# Patient Record
Sex: FEMALE | URBAN - NONMETROPOLITAN AREA
[De-identification: names, ages, dates, MRNs, and addresses within clinical notes are randomized per-mention and may not be internally consistent; named-entity substitution may affect disease eponyms.]

---

## 2024-02-14 ENCOUNTER — APPOINTMENT (OUTPATIENT)
Dept: URBAN - NONMETROPOLITAN AREA SURGERY 8 | Age: 69
Setting detail: DERMATOLOGY
End: 2024-02-15

## 2024-02-14 DIAGNOSIS — L30.9 DERMATITIS, UNSPECIFIED: ICD-10-CM

## 2024-02-14 PROCEDURE — OTHER MIPS QUALITY: OTHER

## 2024-02-14 PROCEDURE — OTHER BIOPSY BY PUNCH METHOD: OTHER

## 2024-02-14 PROCEDURE — 11104 PUNCH BX SKIN SINGLE LESION: CPT

## 2024-02-14 PROCEDURE — 11105 PUNCH BX SKIN EA SEP/ADDL: CPT

## 2024-02-14 PROCEDURE — OTHER TREATMENT REGIMEN: OTHER

## 2024-02-14 ASSESSMENT — LOCATION SIMPLE DESCRIPTION DERM
LOCATION SIMPLE: LEFT UPPER ARM
LOCATION SIMPLE: RIGHT THIGH

## 2024-02-14 ASSESSMENT — LOCATION DETAILED DESCRIPTION DERM
LOCATION DETAILED: LEFT ANTERIOR DISTAL UPPER ARM
LOCATION DETAILED: RIGHT ANTERIOR PROXIMAL THIGH

## 2024-02-14 ASSESSMENT — LOCATION ZONE DERM
LOCATION ZONE: ARM
LOCATION ZONE: LEG

## 2024-02-14 NOTE — PROCEDURE: TREATMENT REGIMEN
Detail Level: Zone
Plan: New onset dermatitis in February 2023 with associated joint pain and inflammation.  Being followed by rheumatology.  She was recently treated triamcinolone and prednisone. During that time, she notes the rash improved.  We discussed that findings on biopsy may be impacted by treatment, but can be repeated if need be.

## 2024-02-14 NOTE — HPI: RASH
Is This A New Presentation, Or A Follow-Up?: Rash
Additional History: X-ray for hand swelling showed some arthritis \\nX-ray for shoulder swelling showed normal \\nX-ray for feet showed normal\\nX-ray for hip showed normal

## 2024-02-14 NOTE — PROCEDURE: BIOPSY BY PUNCH METHOD
Body Location Override (Optional - Billing Will Still Be Based On Selected Body Map Location If Applicable): right thigh
Detail Level: Detailed
Was A Bandage Applied: Yes
Punch Size In Mm: 4
Size Of Lesion In Cm (Optional): 0
Depth Of Punch Biopsy: dermis
Biopsy Type: H and E
Anesthesia Type: 1% lidocaine with epinephrine
Anesthesia Volume In Cc: 0.5
Hemostasis: None
Epidermal Sutures: gel foam
Wound Care: Petrolatum
Dressing: bandage
Patient Will Remove Sutures At Home?: No
Lab: [id number]
Consent: Written consent was obtained and risks were reviewed including but not limited to scarring, infection, bleeding, scabbing, incomplete removal, nerve damage and allergy to anesthesia.
Post-Care Instructions: I reviewed with the patient in detail post-care instructions. Patient is to keep the biopsy site dry overnight, and then apply bacitracin twice daily until healed. Patient may apply hydrogen peroxide soaks to remove any crusting.
Home Suture Removal Text: Patient was provided a home suture removal kit and will remove their sutures at home.  If they have any questions or difficulties they will call the office.
Notification Instructions: Patient will be notified of biopsy results. However, patient instructed to call the office if not contacted within 2 weeks.
Billing Type: Third-Party Bill
Information: Selecting Yes will display possible errors in your note based on the variables you have selected. This validation is only offered as a suggestion for you. PLEASE NOTE THAT THE VALIDATION TEXT WILL BE REMOVED WHEN YOU FINALIZE YOUR NOTE. IF YOU WANT TO FAX A PRELIMINARY NOTE YOU WILL NEED TO TOGGLE THIS TO 'NO' IF YOU DO NOT WANT IT IN YOUR FAXED NOTE.
Body Location Override (Optional - Billing Will Still Be Based On Selected Body Map Location If Applicable): left upper arm

## 2024-02-27 ENCOUNTER — APPOINTMENT (OUTPATIENT)
Dept: URBAN - NONMETROPOLITAN AREA SURGERY 8 | Age: 69
Setting detail: DERMATOLOGY
End: 2024-02-27

## 2024-02-27 DIAGNOSIS — L92.0 GRANULOMA ANNULARE: ICD-10-CM

## 2024-02-27 PROCEDURE — OTHER PRESCRIPTION: OTHER

## 2024-02-27 PROCEDURE — OTHER COUNSELING: OTHER

## 2024-02-27 PROCEDURE — 99213 OFFICE O/P EST LOW 20 MIN: CPT

## 2024-02-27 PROCEDURE — OTHER PRESCRIPTION MEDICATION MANAGEMENT: OTHER

## 2024-02-27 PROCEDURE — OTHER MIPS QUALITY: OTHER

## 2024-02-27 PROCEDURE — OTHER DIAGNOSIS COMMENT: OTHER

## 2024-02-27 RX ORDER — TACROLIMUS 1 MG/G
OINTMENT TOPICAL
Qty: 100 | Refills: 4 | Status: ERX | COMMUNITY
Start: 2024-02-27

## 2024-02-27 ASSESSMENT — LOCATION SIMPLE DESCRIPTION DERM
LOCATION SIMPLE: RIGHT THIGH
LOCATION SIMPLE: LEFT UPPER ARM

## 2024-02-27 ASSESSMENT — LOCATION DETAILED DESCRIPTION DERM
LOCATION DETAILED: LEFT ANTERIOR PROXIMAL UPPER ARM
LOCATION DETAILED: RIGHT ANTERIOR PROXIMAL THIGH

## 2024-02-27 ASSESSMENT — LOCATION ZONE DERM
LOCATION ZONE: LEG
LOCATION ZONE: ARM

## 2024-02-27 NOTE — PROCEDURE: DIAGNOSIS COMMENT
Render Risk Assessment In Note?: no
Detail Level: Simple
Comment: Accession#: [id number], biopsy proven GA.

## 2024-02-27 NOTE — PROCEDURE: PRESCRIPTION MEDICATION MANAGEMENT
Initiate Treatment: Tacrolimus to affected areas bid
Render In Strict Bullet Format?: No
Detail Level: Zone
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 She has used triamcinolone and brief taper of prednisone in the past. Start tacrolimus bid to the affected areas. She is being worked up for possible psoriatic arthritis. At times, methotrexate can help both PsA and GA. TNF inhibitors have been reported to help with GA. She has follow up with her rheumatologist to discuss treatment for her joint pain in the near future.  She will follow up 3 months with us.

## 2024-02-27 NOTE — PROCEDURE: MIPS QUALITY
Detail Level: Detailed
Quality 47: Advance Care Plan: Advance Care Planning discussed and documented in the medical record; patient did not wish or was not able to name a surrogate decision maker or provide an advance care plan.
Quality 431: Preventive Care And Screening: Unhealthy Alcohol Use - Screening: Patient not identified as an unhealthy alcohol user when screened for unhealthy alcohol use using a systematic screening method
Quality 130: Documentation Of Current Medications In The Medical Record: Current Medications Documented
Quality 110: Preventive Care And Screening: Influenza Immunization: Influenza Immunization previously received during influenza season
Quality 226: Preventive Care And Screening: Tobacco Use: Screening And Cessation Intervention: Patient screened for tobacco use and is an ex/non-smoker
Additional Notes: *** Meds documented to the best of my ability with the resources available

## 2024-06-26 ENCOUNTER — APPOINTMENT (OUTPATIENT)
Dept: URBAN - NONMETROPOLITAN AREA SURGERY 8 | Age: 69
Setting detail: DERMATOLOGY
End: 2024-06-26

## 2024-06-26 DIAGNOSIS — L92.0 GRANULOMA ANNULARE: ICD-10-CM

## 2024-06-26 PROCEDURE — OTHER COUNSELING: OTHER

## 2024-06-26 PROCEDURE — OTHER DIAGNOSIS COMMENT: OTHER

## 2024-06-26 PROCEDURE — OTHER PRESCRIPTION MEDICATION MANAGEMENT: OTHER

## 2024-06-26 PROCEDURE — 99214 OFFICE O/P EST MOD 30 MIN: CPT

## 2024-06-26 PROCEDURE — OTHER ADDITIONAL NOTES: OTHER

## 2024-06-26 PROCEDURE — OTHER MIPS QUALITY: OTHER

## 2024-06-26 ASSESSMENT — LOCATION SIMPLE DESCRIPTION DERM
LOCATION SIMPLE: LEFT UPPER ARM
LOCATION SIMPLE: LEFT FOREARM
LOCATION SIMPLE: RIGHT FOREARM
LOCATION SIMPLE: RIGHT THIGH
LOCATION SIMPLE: ABDOMEN
LOCATION SIMPLE: LEFT THIGH

## 2024-06-26 ASSESSMENT — LOCATION DETAILED DESCRIPTION DERM
LOCATION DETAILED: EPIGASTRIC SKIN
LOCATION DETAILED: LEFT ANTERIOR PROXIMAL THIGH
LOCATION DETAILED: RIGHT ANTERIOR PROXIMAL THIGH
LOCATION DETAILED: RIGHT VENTRAL PROXIMAL FOREARM
LOCATION DETAILED: LEFT ANTERIOR PROXIMAL UPPER ARM
LOCATION DETAILED: LEFT VENTRAL PROXIMAL FOREARM

## 2024-06-26 ASSESSMENT — LOCATION ZONE DERM
LOCATION ZONE: LEG
LOCATION ZONE: TRUNK
LOCATION ZONE: ARM

## 2024-06-26 NOTE — PROCEDURE: PRESCRIPTION MEDICATION MANAGEMENT
Render In Strict Bullet Format?: No
Plan: No improvement with topical steroids and protopic\\nShe feels that the patches and papule are spreading to involve more of her trunk and extremities\\nShe is due to see Dr Gomez to discuss her joint pains, currently on celebrex \\nWe discussed options, plan to start Hcq 200 mg BID \\nSlouisa has an ophthalmologist and has had a yearly eye exam-records requested, she will notify them of starting Hcq\\nSloiusa is on metoprolol, potential interaction with Hcq, she notes she discussed stopping metoprolol with her pcp previously and will notify him of addition of Hcq\\nFollow up 4 months
Detail Level: Zone

## 2024-06-26 NOTE — PROCEDURE: DIAGNOSIS COMMENT
Render Risk Assessment In Note?: no
Detail Level: Simple
Comment: Accession#: [id number], biopsy proven GA.

## 2024-06-26 NOTE — PROCEDURE: ADDITIONAL NOTES
Render Risk Assessment In Note?: no
Detail Level: Zone
Additional Notes: Rheumatologist prescribed Celebrex for joint pain.

## 2025-03-03 ENCOUNTER — APPOINTMENT (OUTPATIENT)
Dept: URBAN - NONMETROPOLITAN AREA SURGERY 8 | Age: 70
Setting detail: DERMATOLOGY
End: 2025-03-03

## 2025-03-03 DIAGNOSIS — L92.0 GRANULOMA ANNULARE: ICD-10-CM

## 2025-03-03 DIAGNOSIS — L73.8 OTHER SPECIFIED FOLLICULAR DISORDERS: ICD-10-CM

## 2025-03-03 DIAGNOSIS — L57.8 OTHER SKIN CHANGES DUE TO CHRONIC EXPOSURE TO NONIONIZING RADIATION: ICD-10-CM

## 2025-03-03 DIAGNOSIS — D22 MELANOCYTIC NEVI: ICD-10-CM

## 2025-03-03 DIAGNOSIS — L82.1 OTHER SEBORRHEIC KERATOSIS: ICD-10-CM

## 2025-03-03 PROBLEM — D22.5 MELANOCYTIC NEVI OF TRUNK: Status: ACTIVE | Noted: 2025-03-03

## 2025-03-03 PROCEDURE — OTHER PRESCRIPTION MEDICATION MANAGEMENT: OTHER

## 2025-03-03 PROCEDURE — OTHER MIPS QUALITY: OTHER

## 2025-03-03 PROCEDURE — 99214 OFFICE O/P EST MOD 30 MIN: CPT

## 2025-03-03 PROCEDURE — OTHER COUNSELING: OTHER

## 2025-03-03 ASSESSMENT — LOCATION DETAILED DESCRIPTION DERM
LOCATION DETAILED: RIGHT ANTERIOR PROXIMAL THIGH
LOCATION DETAILED: LEFT MEDIAL MALAR CHEEK
LOCATION DETAILED: LEFT ANTERIOR PROXIMAL THIGH
LOCATION DETAILED: LEFT SUPERIOR MEDIAL MIDBACK
LOCATION DETAILED: LEFT LATERAL SUPERIOR CHEST
LOCATION DETAILED: MID TRAPEZIAL NECK

## 2025-03-03 ASSESSMENT — LOCATION SIMPLE DESCRIPTION DERM
LOCATION SIMPLE: LEFT LOWER BACK
LOCATION SIMPLE: RIGHT THIGH
LOCATION SIMPLE: TRAPEZIAL NECK
LOCATION SIMPLE: LEFT THIGH
LOCATION SIMPLE: CHEST
LOCATION SIMPLE: LEFT CHEEK

## 2025-03-03 ASSESSMENT — LOCATION ZONE DERM
LOCATION ZONE: FACE
LOCATION ZONE: LEG
LOCATION ZONE: NECK
LOCATION ZONE: TRUNK

## 2025-03-03 NOTE — PROCEDURE: PRESCRIPTION MEDICATION MANAGEMENT
Render In Strict Bullet Format?: No
Plan: Improved with tacrolimus ointment\\nNever started Hcq \\nUse tacrolimus on Prn basis
Detail Level: Zone